# Patient Record
Sex: FEMALE
[De-identification: names, ages, dates, MRNs, and addresses within clinical notes are randomized per-mention and may not be internally consistent; named-entity substitution may affect disease eponyms.]

---

## 2019-06-03 ENCOUNTER — HOSPITAL ENCOUNTER (OUTPATIENT)
Dept: HOSPITAL 88 - ER | Age: 79
Setting detail: OBSERVATION
LOS: 2 days | Discharge: HOME | End: 2019-06-05
Attending: INTERNAL MEDICINE | Admitting: INTERNAL MEDICINE
Payer: MEDICARE

## 2019-06-03 VITALS — SYSTOLIC BLOOD PRESSURE: 144 MMHG | DIASTOLIC BLOOD PRESSURE: 66 MMHG

## 2019-06-03 VITALS — WEIGHT: 174.02 LBS | BODY MASS INDEX: 29.71 KG/M2 | HEIGHT: 64 IN

## 2019-06-03 VITALS — SYSTOLIC BLOOD PRESSURE: 142 MMHG | DIASTOLIC BLOOD PRESSURE: 68 MMHG

## 2019-06-03 VITALS — SYSTOLIC BLOOD PRESSURE: 131 MMHG | DIASTOLIC BLOOD PRESSURE: 60 MMHG

## 2019-06-03 VITALS — DIASTOLIC BLOOD PRESSURE: 67 MMHG | SYSTOLIC BLOOD PRESSURE: 178 MMHG

## 2019-06-03 VITALS — DIASTOLIC BLOOD PRESSURE: 66 MMHG | SYSTOLIC BLOOD PRESSURE: 144 MMHG

## 2019-06-03 DIAGNOSIS — K21.9: ICD-10-CM

## 2019-06-03 DIAGNOSIS — R73.03: ICD-10-CM

## 2019-06-03 DIAGNOSIS — R07.89: Primary | ICD-10-CM

## 2019-06-03 DIAGNOSIS — F39: ICD-10-CM

## 2019-06-03 DIAGNOSIS — I10: ICD-10-CM

## 2019-06-03 DIAGNOSIS — N39.0: ICD-10-CM

## 2019-06-03 DIAGNOSIS — Z82.49: ICD-10-CM

## 2019-06-03 LAB
ALBUMIN SERPL-MCNC: 3.7 G/DL (ref 3.5–5)
ALBUMIN/GLOB SERPL: 0.9 {RATIO} (ref 0.8–2)
ALP SERPL-CCNC: 66 IU/L (ref 40–150)
ALT SERPL-CCNC: 18 IU/L (ref 0–55)
ANION GAP SERPL CALC-SCNC: 10.5 MMOL/L (ref 8–16)
BACTERIA URNS QL MICRO: (no result) /HPF
BASOPHILS # BLD AUTO: 0.1 10*3/UL (ref 0–0.1)
BASOPHILS NFR BLD AUTO: 1.1 % (ref 0–1)
BILIRUB UR QL: NEGATIVE
BUN SERPL-MCNC: 16 MG/DL (ref 7–26)
BUN/CREAT SERPL: 21 (ref 6–25)
CALCIUM SERPL-MCNC: 9.5 MG/DL (ref 8.4–10.2)
CHLORIDE SERPL-SCNC: 101 MMOL/L (ref 98–107)
CK MB SERPL-MCNC: 0.7 NG/ML (ref 0–5)
CK MB SERPL-MCNC: 1.3 NG/ML (ref 0–5)
CK SERPL-CCNC: 67 IU/L (ref 29–168)
CK SERPL-CCNC: 80 IU/L (ref 29–168)
CLARITY UR: CLEAR
CO2 SERPL-SCNC: 25 MMOL/L (ref 22–29)
COLOR UR: YELLOW
DEPRECATED APTT PLAS QN: 26.5 SECONDS (ref 23.8–35.5)
DEPRECATED INR PLAS: 0.92
DEPRECATED NEUTROPHILS # BLD AUTO: 4.1 10*3/UL (ref 2.1–6.9)
DEPRECATED RBC URNS MANUAL-ACNC: (no result) /HPF (ref 0–5)
EGFRCR SERPLBLD CKD-EPI 2021: > 60 ML/MIN (ref 60–?)
EOSINOPHIL # BLD AUTO: 0.1 10*3/UL (ref 0–0.4)
EOSINOPHIL NFR BLD AUTO: 0.9 % (ref 0–6)
EPI CELLS URNS QL MICRO: (no result) /LPF
ERYTHROCYTE [DISTWIDTH] IN CORD BLOOD: 14.6 % (ref 11.7–14.4)
GLOBULIN PLAS-MCNC: 3.9 G/DL (ref 2.3–3.5)
GLUCOSE SERPLBLD-MCNC: 115 MG/DL (ref 74–118)
HCT VFR BLD AUTO: 37.1 % (ref 34.2–44.1)
HGB BLD-MCNC: 12.3 G/DL (ref 12–16)
KETONES UR QL STRIP.AUTO: NEGATIVE
LEUKOCYTE ESTERASE UR QL STRIP.AUTO: (no result)
LYMPHOCYTES # BLD: 1.9 10*3/UL (ref 1–3.2)
LYMPHOCYTES NFR BLD AUTO: 29 % (ref 18–39.1)
MCH RBC QN AUTO: 28.3 PG (ref 28–32)
MCHC RBC AUTO-ENTMCNC: 33.2 G/DL (ref 31–35)
MCV RBC AUTO: 85.3 FL (ref 81–99)
MONOCYTES # BLD AUTO: 0.5 10*3/UL (ref 0.2–0.8)
MONOCYTES NFR BLD AUTO: 7.8 % (ref 4.4–11.3)
NEUTS SEG NFR BLD AUTO: 61 % (ref 38.7–80)
NITRITE UR QL STRIP.AUTO: NEGATIVE
PLATELET # BLD AUTO: 334 X10E3/UL (ref 140–360)
POTASSIUM SERPL-SCNC: 3.5 MMOL/L (ref 3.5–5.1)
PROT UR QL STRIP.AUTO: (no result)
PROTHROMBIN TIME: 12.8 SECONDS (ref 11.9–14.5)
RBC # BLD AUTO: 4.35 X10E6/UL (ref 3.6–5.1)
SODIUM SERPL-SCNC: 133 MMOL/L (ref 136–145)
SP GR UR STRIP: 1.01 (ref 1.01–1.02)
UROBILINOGEN UR STRIP-MCNC: 0.2 MG/DL (ref 0.2–1)

## 2019-06-03 PROCEDURE — 85025 COMPLETE CBC W/AUTO DIFF WBC: CPT

## 2019-06-03 PROCEDURE — 80061 LIPID PANEL: CPT

## 2019-06-03 PROCEDURE — 84484 ASSAY OF TROPONIN QUANT: CPT

## 2019-06-03 PROCEDURE — 85610 PROTHROMBIN TIME: CPT

## 2019-06-03 PROCEDURE — 82550 ASSAY OF CK (CPK): CPT

## 2019-06-03 PROCEDURE — 71045 X-RAY EXAM CHEST 1 VIEW: CPT

## 2019-06-03 PROCEDURE — 81001 URINALYSIS AUTO W/SCOPE: CPT

## 2019-06-03 PROCEDURE — 99284 EMERGENCY DEPT VISIT MOD MDM: CPT

## 2019-06-03 PROCEDURE — 87086 URINE CULTURE/COLONY COUNT: CPT

## 2019-06-03 PROCEDURE — 85730 THROMBOPLASTIN TIME PARTIAL: CPT

## 2019-06-03 PROCEDURE — 82553 CREATINE MB FRACTION: CPT

## 2019-06-03 PROCEDURE — 83036 HEMOGLOBIN GLYCOSYLATED A1C: CPT

## 2019-06-03 PROCEDURE — 83735 ASSAY OF MAGNESIUM: CPT

## 2019-06-03 PROCEDURE — 83880 ASSAY OF NATRIURETIC PEPTIDE: CPT

## 2019-06-03 PROCEDURE — 93005 ELECTROCARDIOGRAM TRACING: CPT

## 2019-06-03 PROCEDURE — 80053 COMPREHEN METABOLIC PANEL: CPT

## 2019-06-03 PROCEDURE — 36415 COLL VENOUS BLD VENIPUNCTURE: CPT

## 2019-06-03 RX ADMIN — CEFTRIAXONE SCH MLS/HR: 100 INJECTION, POWDER, FOR SOLUTION INTRAVENOUS at 11:18

## 2019-06-03 RX ADMIN — FAMOTIDINE SCH MG: 10 INJECTION, SOLUTION INTRAVENOUS at 11:20

## 2019-06-03 RX ADMIN — FAMOTIDINE SCH MG: 10 INJECTION, SOLUTION INTRAVENOUS at 21:04

## 2019-06-03 NOTE — DIAGNOSTIC IMAGING REPORT
Examination: Single AP view of the chest.



COMPARISON: December 6, 2017



INDICATION: Chest pain

     

DISCUSSION:



Lines/tubes:  None.



Lungs:  The lungs are well inflated and clear. No pneumonia or pulmonary edema.



Pleura:  No pleural effusion or pneumothorax.



Heart and mediastinum:  The heart and the mediastinum are unremarkable.



Bones and soft tissues:  No acute bony abnormalities.  



IMPRESSION:

 

1.   No acute cardiopulmonary abnormalities.



Signed by: Dr. Andrew Palisch, M.D. on 6/3/2019 11:42 AM

## 2019-06-03 NOTE — XMS REPORT
Summary of Care: 18 - 18

                             Created on: 2093



ALDO PRICE

External Reference #: [id number]

: 1940

Sex: Female



Demographics







                          Address                   [address] [place]  [address]

 

                          Home Phone                [phone]

 

                          Preferred Language        English

 

                          Marital Status            

 

                          Latter-day Affiliation     Unknown

 

                          Race                      Other

 

                                        Additional Race(s)  

 

                          Ethnic Group              /Latin





Author







                          Author                    [facility] [place]

 

                          Organization              [facility] [place]

 

                          Address                   Unknown

 

                          Phone                     Unavailable







Encounter





HQ Encntr_alias(FIN) [id number] Date(s): 18 - 18

[facility] [place] [address] [place] [address]      [phone]
[phone]

Discharge Disposition: Home or Self Care

Attending Physician: Norma Rankin MD





Vital Signs





No data available for this section



Problem List





No data available for this section



Allergies, Adverse Reactions, Alerts





No data available for this section



Medications





No data available for this section



Results





No data available for this section



Immunizations





No data available for this section



Procedures





No data available for this section



Social History





No data available for this section



Assessment and Plan





No data available for this section

## 2019-06-03 NOTE — XMS REPORT
Summary of Care: 10/10/17 - 10/10/17

                             Created on: 2056



ALDO PRICE

External Reference #: [id number]

: 1940

Sex: Female



Demographics







                          Address                   [address] [place] [address] [place]  [address]

 

                          Home Phone                [phone]

 

                          Preferred Language        Unknown

 

                          Marital Status            

 

                          Tenriism Affiliation     Unknown

 

                          Race                      Other

 

                          Ethnic Group              Unknown





Author







                          Author                    [facility] [place]

 

                          Organization              [facility] [place]

 

                          Address                   Unknown

 

                          Phone                     Unavailable







Encounter





HQ Encntr_alias(FIN) [id number] Date(s): 10/10/17 - 10/10/17

[facility] [place] [address] [place] [address]      [phone]
[phone]

Discharge Disposition: Home or Self Care

Attending Physician: Norma Rankin MD





Vital Signs





No data available for this section



Problem List





No data available for this section



Allergies, Adverse Reactions, Alerts





No data available for this section



Medications





No data available for this section



Results





No data available for this section



Immunizations





No data available for this section



Procedures





No data available for this section



Social History





No data available for this section



Assessment and Plan





No data available for this section

## 2019-06-03 NOTE — XMS REPORT
Summary of Care: 3/11/19 - 3/11/19

                             Created on: 10/02/2071



ALBERT ALDO BONITA

External Reference #: [id number]

: 1940

Sex: Female



Demographics







                          Address                   [address] [place] [address] [place]  [address]

 

                          Home Phone                [phone]

 

                          Preferred Language        Unknown

 

                          Marital Status            

 

                          Orthodoxy Affiliation     Unknown

 

                          Race                      Other

 

                                        Additional Race(s)  

 

                          Ethnic Group              /Latin





Author







                          Author                    [facility]

 

                          Address                   Unknown

 

                          Phone                     Unavailable







Encounter





HQ Encntr_[place](FIN) [id number] Date(s): 3/11/19 - 3/11/19

[facility] [address] [address] [facility] [address], [address] [place] [address]-      

Discharge Disposition: Home or Self Care

Attending Physician: Jered Lanier MD

Referring Physician: Jered Lanier MD





Vital Signs





No data available for this section



Problem List







    



              Condition     Effective Dates     Status       Health Status     Informant

 

    



                           Diabetes(Confirmed)       Resolved  

 

    



                           Acid                      Resolved  



                                         reflux(Confirmed)    

 

    



                           High blood                Resolved  



                                         pressure(Confirmed)    







Allergies, Adverse Reactions, Alerts







   



                 Substance       Reaction        Severity        Status

 

   



                           NKDA                      Active







Medications





No data available for this section



Results





No data available for this section



Immunizations





No data available for this section



Procedures







    



              Procedure     Date         Related Diagnosis     Body Site     Status

 

    



                           Caesarean section         Completed

 

    



                           Gallbladder operation        Completed







Social History







 



                           Social History Type       Response

 

 



                           Smoking Status            Never smoker; Exposure to Tobacco Smoke None; Cigarette Smoking

 Last 365



                                         Days No; Reg Smoking Cessation Counseling No



                                         entered on: 3/4/19







Assessment and Plan





No data available for this section

## 2019-06-03 NOTE — XMS REPORT
Continuity of Care Document

                             Created on: 2019



ALDO PRICE

External Reference #: [id number]

: 1940

Sex: Female



Demographics







                          Address                   [address] [place] [address] [place]  [address]

 

                          Home Phone                [phone]

 

                          Preferred Language        Unknown

 

                          Marital Status            Unknown

 

                          Gnosticism Affiliation     Unknown

 

                          Race                      Unknown

 

                          Ethnic Group              Unknown





Author







                          Author                    [facility]              Interface

 

                          Address                   Unknown

 

                          Phone                     Unavailable



                                                    



Problems

                    





                    Problem                            Status                            Onset Date     

                          Classification                            Date Reported       

                          Comments                            Source                    

 

                    Pain in left upper arm                                                        10/25/2018

                                                        2019                   

                                                       [facility] [place]                    

 

                          Z12.39 - ENCOUNTER FOR OTH SCREENING FO                            Active         

                    2018                                                          

                                                       [facility] [place]               

     

 

                    Diabetes                            Resolved                                        

                          Problem                            2019                        

                                                       [facility] [place]                    

 

                    Acid reflux                            Resolved                                     

                          Problem                            2019                     

                                                       [facility] [place]                    

 

                    High blood pressure                            Resolved                             

                           Problem                            2019             

                                                       [facility] [place]                    

 

                    Bursitis of left shoulder                                                           

                                                      2019                      

                                                       [facility] [place]                    



                                                                                
                                                                                
      



Medications

                    





                    Medication                            Details                            Route      

                          Status                            Patient Instructions         

                          Ordering Provider                            Order Date           

                                        Source                    



                                                                        



Allergies, Adverse Reactions, Alerts

                    





                    Substance                            Category                            Reaction   

                          Severity                            Reaction type           

                          Status                            Date Reported                     

                          Comments                            Source                    

 

                          No Known Medication Allergies                            Assertion                

                                                                            Drug allergy       

                                                                                       

                                         [facility]                    



                                                                        



Immunizations

                    





                    Immunization                            Date Given                            Site  

                          Status                            Last Updated             

                          Comments                            Source                    



                                                                        



Results

                    





                    Order Name                            Results                            Value      

                          Reference Range                            Date                

                          Interpretation                            Comments                       

                                        Source                    

 

                    Renal Stone CT                            Renal Stone CT                            

EXAM:  CT ABDOMEN AND PELVIS WITHOUT CONTRAST



DATE: 3/11/2019 10:06 CDT

 

INDICATION: R31.9 Hematuria - hematuria

 

ADDITIONAL INFORMATION: Patient states found blood in urine one month ago



COMPARISON: 2012



TECHNIQUE: Volumetric CT acquisition of the abdomen and pelvis without 
intravenous contrast. Axial, coronal and sagittal reconstructions.

   IV contrast: None

   Oral contrast: None.

   DLP: 863mGy/cm



FINDINGS:  



Lines and tubes: None.



Lower thorax: Bilateral calcified hilar nodes, as well as some bronchial wall 
calcifications identified. No pleural or pericardial effusion. Bibasilar gravity
dependent subsegmental posterior basilar atelectasis.



Liver: Normal.



Biliary tree: No intra- or extrahepatic biliary ductal dilation.



Gallbladder: Cholecystectomy.



Pancreas: Focal fatty changes are noted in the pancreatic head..



Spleen: Normal.



Adrenals: Normal.



Kidneys and ureters: No evidence of abnormal calcifications, no hydronephrosis.



Bladder: Normal.



Reproductive organs: Uterus and adnexa appear unremarkable.



Gastrointestinal tract:The stomach is normal in appearance. Small sliding hiatal
hernia is noted. No abnormalities of the large and small bowel are demonstrated.
The small intestine and colon are of normal course and caliber with no 
constricting or obstructing lesions, masses, or surrounding inflammatory 
changes. Multiple colonic diverticulosis, with no evidence of acute 
diverticulitis. No rectal or perirectal abnormalities are seen.



Appendix: Normal



Peritoneum and retroperitoneum: No lymphadenopathy, ascites or free air. No 
other fluid collection.





Lymph nodes: No abdominal or pelvic lymphadenopathy is seen.



Vasculature: Mild atheromatous calcifications abdominal aorta and iliac 
arteries.

Bones: Degenerative changes of the lumbosacral spine. Interim appearance of 
focal compression deformity of L1 vertebral body.



Soft tissues/abdominal wall: Normal. No hernias, masses, or fluid collections 
are seen.



IMPRESSION:  

                                        1. No evidence of abnormal calcifications in the kidneys ureters or bladder. No 

evidence of hydronephrosis.

                                        2. Diverticulosis left colon, no evidence of acute diverticulitis.





                                                          2019                 

                                                      -

                                        -





Read by:  Mike Carlin MD

Dictated Date/time:  19 11:05

Electronically Signed by:  Mike Carlin               19 
12:04

FINAL REPORT

                                        [facility] wo contrast CT                            Elbow wo contrast CT              

                                        EXAM: CT LEFT SHOULDER WITHOUT CONTRAST

EXAM: CT LEFT HUMERUS WITHOUT CONTRAST

EXAM: CT LEFT ELBOW WITHOUT CONTRAST



DATE: 10/19/2018 1:57 PM CDT



INDICATION: M79.622   Pain in left upper arm - Left Humerus arm pain for the 
past 2 weeks



ADDITIONAL INFORMATION: Patient History Form -- "left arm pain, unable to rotate
arm"



COMPARISON: None.



TECHNIQUE: Volumetric CT scans of the left shoulder, left humerus, and left 
elbow are acquired without contrast. Axial, sagittal and coronal images are 
provided. 3D reconstructions are created at the acquisition workstation.



IV contrast: None.

DLP: 316 mGy-cm -- left shoulder and humerus

DLP: 167 mGy-cm -- left elbow



FINDINGS: 



Shoulder: No displaced fracture or malalignment. There is severe glenohumeral 
joint space narrowing with associated subcortical cystic change and sclerosis of
the inferior aspect of the glenoid and marginal osteophyte formation of the 
humeral head. There is mild degenerative bony hypertrophy and undersurface 
spurring of the acromioclavicular joint. There is subcortical cystic change at 
the anterior aspect of the greater tuberosity and lesser tuberosity.



Humerus: Intact.



Elbow: Intact.



Soft tissues: No soft tissue swelling. There is a large subacromial/subdeltoid 
bursal effusion. No elbow joint effusion. No radiopaque foreign body or 
subcutaneous emphysema. No pneumarthrosis.



IMPRESSION:  

                                        1.  No acute bony abnormality.

                                        2.  Left subacromial/subdeltoid bursitis, large volume. This is nonspecific, but

 could be related to mechanical impingement or be related to inflammatory or 
crystalline arthropathy. Consider percutaneous sampling of the fluid if 
clinically warranted.

                                        3.  Advanced glenohumeral degenerative change.



                                                          10/19/2018                 

                                                      -

                                        -

This report was dictated by a Radiology Resident/Fellow.  I have personally 
reviewed the images as

well as the Resident's interpretation and agree with the findings.

Read by:  Mert Velásquez (Fellow)       Resident:  Mert Velásquez 
(Fellow)

Dictated Date/time:  10/19/18 15:07

Electronically Signed by:  Faizan Godfrey MD                  10/19/18 
15:54

FINAL REPORT

                                        [facility] Humerus wo contrast CT                            Humerus wo contrast CT          

                                        EXAM: CT LEFT SHOULDER WITHOUT CONTRAST

EXAM: CT LEFT HUMERUS WITHOUT CONTRAST

EXAM: CT LEFT ELBOW WITHOUT CONTRAST



DATE: 10/19/2018 1:57 PM CDT



INDICATION: M79.622   Pain in left upper arm - Left Humerus arm pain for the 
past 2 weeks



ADDITIONAL INFORMATION: Patient History Form -- "left arm pain, unable to rotate
arm"



COMPARISON: None.



TECHNIQUE: Volumetric CT scans of the left shoulder, left humerus, and left 
elbow are acquired without contrast. Axial, sagittal and coronal images are 
provided. 3D reconstructions are created at the acquisition workstation.



IV contrast: None.

DLP: 316 mGy-cm -- left shoulder and humerus

DLP: 167 mGy-cm -- left elbow



FINDINGS: 



Shoulder: No displaced fracture or malalignment. There is severe glenohumeral 
joint space narrowing with associated subcortical cystic change and sclerosis of
the inferior aspect of the glenoid and marginal osteophyte formation of the 
humeral head. There is mild degenerative bony hypertrophy and undersurface 
spurring of the acromioclavicular joint. There is subcortical cystic change at 
the anterior aspect of the greater tuberosity and lesser tuberosity.



Humerus: Intact.



Elbow: Intact.



Soft tissues: No soft tissue swelling. There is a large subacromial/subdeltoid 
bursal effusion. No elbow joint effusion. No radiopaque foreign body or 
subcutaneous emphysema. No pneumarthrosis.



IMPRESSION:  

                                        1.  No acute bony abnormality.

                                        2.  Left subacromial/subdeltoid bursitis, large volume. This is nonspecific, but

 could be related to mechanical impingement or be related to inflammatory or 
crystalline arthropathy. Consider percutaneous sampling of the fluid if 
clinically warranted.

                                        3.  Advanced glenohumeral degenerative change.



                                                          10/19/2018                 

                                                      -

                                        -

This report was dictated by a Radiology Resident/Fellow.  I have personally 
reviewed the images as

well as the Resident's interpretation and agree with the findings.

Read by:  Mert Velásquez (Fellow)       Resident:  Mert Velásquez 
(Fellow)

Dictated Date/time:  10/19/18 15:07

Electronically Signed by:  Faizan Godfrey MD                  10/19/18 
15:54

FINAL REPORT

                                        [facility] wo contrast CT                            Shoulder wo contrast CT        

                                        EXAM: CT LEFT SHOULDER WITHOUT CONTRAST

EXAM: CT LEFT HUMERUS WITHOUT CONTRAST

EXAM: CT LEFT ELBOW WITHOUT CONTRAST



DATE: 10/19/2018 1:57 PM CDT



INDICATION: M79.622   Pain in left upper arm - Left Humerus arm pain for the 
past 2 weeks



ADDITIONAL INFORMATION: Patient History Form -- "left arm pain, unable to rotate
arm"



COMPARISON: None.



TECHNIQUE: Volumetric CT scans of the left shoulder, left humerus, and left 
elbow are acquired without contrast. Axial, sagittal and coronal images are 
provided. 3D reconstructions are created at the acquisition workstation.



IV contrast: None.

DLP: 316 mGy-cm -- left shoulder and humerus

DLP: 167 mGy-cm -- left elbow



FINDINGS: 



Shoulder: No displaced fracture or malalignment. There is severe glenohumeral 
joint space narrowing with associated subcortical cystic change and sclerosis of
the inferior aspect of the glenoid and marginal osteophyte formation of the 
humeral head. There is mild degenerative bony hypertrophy and undersurface 
spurring of the acromioclavicular joint. There is subcortical cystic change at 
the anterior aspect of the greater tuberosity and lesser tuberosity.



Humerus: Intact.



Elbow: Intact.



Soft tissues: No soft tissue swelling. There is a large subacromial/subdeltoid 
bursal effusion. No elbow joint effusion. No radiopaque foreign body or 
subcutaneous emphysema. No pneumarthrosis.



IMPRESSION:  

                                        1.  No acute bony abnormality.

                                        2.  Left subacromial/subdeltoid bursitis, large volume. This is nonspecific, but

 could be related to mechanical impingement or be related to inflammatory or 
crystalline arthropathy. Consider percutaneous sampling of the fluid if 
clinically warranted.

                                        3.  Advanced glenohumeral degenerative change.



                                                          10/19/2018                 

                                                      -

                                        -

This report was dictated by a Radiology Resident/Fellow.  I have personally 
reviewed the images as

well as the Resident's interpretation and agree with the findings.

Read by:  Mert Velásquez (Fellow)       Resident:  Mert Velásquez 
(Fellow)

Dictated Date/time:  10/19/18 15:07

Electronically Signed by:  Faizan Godfrey MD                  10/19/18 
15:54

FINAL REPORT

                                        [facility]                    

 

                          Breast Mammo Scrn SULMA incl CAD MA                            Breast Mammo Scrn SULMA

 incl CAD MA                         





BILATERAL DIGITAL SCREENING MAMMOGRAM WITH CAD: 2018





CLINICAL: Encounter For Screening Mammogram For Malignant Neoplasm Of 
Breast/Z12.31.  





Current study was evaluated with a Computer Aided Detection (CAD) system.  



COMPARISON:Comparison is made to exams dated:  2017 mammogram, 2016 
mammogram - [facility], 2015 mammogram, 4/3/2014 mammogram, 
and 3/27/2013 mammogram - [facility].   



TECHNIQUE: Mammographic views were obtained using digital acquisition. Current 
study was also evaluated with a Computer Aided Detection (CAD) system. 



FINDINGS: 

The tissue of both breasts is heterogeneously dense, which could obscure 
detection of small masses.  



There are benign calcifications in both breasts.  

No significant masses, calcifications, or other findings are seen in either 
breast.  

There has been no significant interval change.





IMPRESSION: BENIGN



RECOMMENDATION:There is no mammographic evidence of malignancy. A 1 year 
screening mammogram is recommended.(2019)   This exam was interpreted at 
[id number] for AUSTIN Christiansen 15.  



Professional services are provided by the [facility] 
[facility].



Mak Whiteside M.D., cm/mariela:2018 15:25:44  



Imaging Technologist(s): RT Aroldo(R)(M), [facility]

letter sent: BI-RADS 1/2 Dense  

Mammogram BI-RADS: 2 Benign

                                                          2018                 

                                                      -

                                        -





Read by:  Mir Polanco MD

Dictated Date/time:  18 15:25

Electronically Signed by:  Mir Polanco MD                      18 
15:25

FINAL REPORT

                                        [facility]                    

 

                          Bone Density DXA Dual Energy MA                            Bone Density DXA Dual Energy

 MA                         



BONE DENSITY ASSESSMENT: 2018



CLINICAL DATA: Post menopausal.  M81.0   Age-Related Osteoporosis Without 
Current Pathological Fracture/M81.0   Age-Related Osteoporosis Without Current 
Pathological Fracture



FINDINGS: 

Bone density evaluation was performed 2018 on the right femur neck using a
Hologic unit. The BMD average for the exam is 0.675  g/cm2. The T-score is -1.60
and the Z-score is 0.40.  This matches the World Health Organization's criteria 
for osteopenia and places the patient at a medium risk for fracture.  



An additional bone density evaluation was performed 2018 on the left femur
neck using a Hologic unit. The BMD average for the exam is 0.683  g/cm2. The T-
score is -1.50 and the Z-score is 0.50.  This matches the World Health 
Organization's criteria for osteopenia and places the patient at a medium risk 
for fracture.  



An additional bone density evaluation was performed 2018 on the right hip 
using a Hologic unit. The BMD average for the exam is 0.751  g/cm2. The T-score 
is -1.60 and the Z-score is 0.30.  This matches the World Health Organization's 
criteria for osteopenia and places the patient at a medium risk for fracture.  



An additional bone density evaluation was performed 2018 on the left hip 
using a Hologic unit. The BMD average for the exam is 0.785  g/cm2. The T-score 
is -1.30 and the Z-score is 0.50.  This matches the World Health Organization's 
criteria for osteopenia and places the patient at a medium risk for fracture.  



An additional bone density evaluation was performed 2018 on the AP L2-L4 
region of spine using a Hologic unit. The BMD average for the exam is 0.901  
g/cm2. The T-score is -1.60 and the Z-score is 0.90.  This matches the World 
Health Organization's criteria for osteopenia and places the patient at a medium
risk for fracture.  



FRAX 10 year probability of major osteoporotic fracture is 6.2% and hip fracture
is 1.2%.  



IMPRESSION: OSTEOPENIA

Patient is at medium risk for fracture.   



Dr. Violeta Barraza D.O.  

ht/penrad:2018 13:57:30  



Imaging Technologist(s): Karmen MARTINEZ)(VICKY), [facility]

                                                          2018                 

                                                      -

                                        -





Read by:  Violeta Barraza DO

Dictated Date/time:  18 13:57

Electronically Signed by:  Violeta Barraza DO                  18 
13:57

FINAL REPORT

                                         [facility]                    



                                                                                
                                                                                
              



Vital Signs

                    





                    Vital Sign                            Value                            Date         

                          Comments                            Source                    



                                                                        



Encounters

                    





                    Location                            Location Details                            Encounter

 Type                            Encounter Number                            Reason For

 Visit                            Attending Provider                            ADM Date

                            DC Date                            Status                

                                        Source                    

 

                          [facility] [place]                                                

                          Outpt Diag Services                            [phone]                  

                                                      Norma Rankin                          

                    10/10/2017                            10/11/2017                                    

                                         [facility] [place]                    

 

                          [facility] [place]                                                

                          Outpt Diag Services                            [phone]                  

                                                      Norma Nguyễn-Yesenia                          

                    2018                                    

                                         [facility] [place]                    

 

                          [facility] [place]                                                

                          Outpt Diag Services                            [phone]                  

                                                      Norma Rankin                          

                    2018                                    

                                         [facility] [place]                    

 

                          [facility] [place]                                                

                          Outpt Diag Services                            [phone]                  

                                                      Norma Rankin                          

                    10/19/2018                            10/20/2018                                    

                                        [facility]                    

 

                                                                            Outpatient                  

                    [id number]                                                        [facility]                             2019                                       

                          Active                            [facility] [facility]                                                

                          Outpt Diag Services                            [id number]                  

                                                Formerly [facility]                             2019                                               

                                        [facility]                    

 

                                                                            Outpatient                  

                    [id number]                                                        [facility]                             2019                                       

                          Active                            [facility]                  

  



                                                                                
                                                                           



Procedures

                    





                    Procedure                            Code                            Date           

                          Perfomer                            Comments                        

                                        Source                    

 

                    Caesarean section                            [id number]                               

                                                                                 [facility]                    

 

                          Gallbladder operation                            [id number]                         

                                                                                                    [facility]

## 2019-06-03 NOTE — XMS REPORT
Summary of Care: 18 - 18

                             Created on: 2078



ALDO PRICE

External Reference #: [id number]

: 1940

Sex: Female



Demographics







                          Address                   [address] [place] [address] [place]  [address]

 

                          Home Phone                [phone]

 

                          Preferred Language        Unknown

 

                          Marital Status            

 

                          Faith Affiliation     Unknown

 

                          Race                      Other

 

                                        Additional Race(s)  

 

                          Ethnic Group              /Latin





Author







                          Author                    [facility]

 

                          Organization              [facility]

 

                          Address                   Unknown

 

                          Phone                     Unavailable







Encounter





HQ Encntr_alias(FIN) [id number] Date(s): 18 - 18

[facility] [address] [place] [address]      [phone]
[phone]

Discharge Disposition: Home or Self Care

Attending Physician: Norma Rankin MD





Vital Signs





No data available for this section



Problem List





No data available for this section



Allergies, Adverse Reactions, Alerts





No data available for this section



Medications





No data available for this section



Results





No data available for this section



Immunizations





No data available for this section



Procedures





No data available for this section



Social History





No data available for this section



Assessment and Plan





No data available for this section

## 2019-06-03 NOTE — XMS REPORT
Patient Summary Document

                             Created on: 2019



ALDO PRICE

External Reference #: [id number]

: 1940

Sex: Female



Demographics







                          Address                   [address] [place]  [address]

 

                          Home Phone                [phone]

 

                          Preferred Language        Unknown

 

                          Marital Status            Unknown

 

                          Religion Affiliation     Unknown

 

                          Race                      Unknown

 

                                        Additional Race(s)  

 

                          Ethnic Group              Unknown





Author







                          Author                    [facility]

 

                          Address                   Unknown

 

                          Phone                     Unavailable







Care Team Providers







                    Care Team Member Name    Role                Phone

 

                    PRITESH BUI    Unavailable         Unavailable







Problems

This patient has no known problems.



Allergies, Adverse Reactions, Alerts

This patient has no known allergies or adverse reactions.



Medications

This patient has no known medications.



Results







           Test Description    Test Time    Test Comments    Text Results    Atomic Results    Result

 Comments

 

                CHEST SINGLE (PORTABLE)                                        [place] [address]      Patient Name: 
ALDO PRICE   MR #: [id number]    : 1940 Age/Sex: 77/F  Acct #: 
[id number] Req #: [id number]  Adm Physician:     Ordered by: MUNIR BUI MD  Report #: [id number]   Location: ER  Room/Bed:     
_______________________________________________________________________
____________________________    Procedure: [id number] DX/CHEST SINGLE (PORTABLE) 
Exam Date:                             Exam Time:        REPORT STATUS: Signed  
 Single view chest  2017      Clinical history: Flulike symptoms.   
Technique: AP view chest   Comparison: None available.      Findings:   The 
lungs are clear and symmetrically inflated. No pleural effusions.  Cardiac   
size is normal for technique.  Pulmonary vasculature is normal.  The skeleton   
is grossly intact.          Impression:   No acute abnormality.            This 
report was generated with voice-recognition technology. Errors in   
transcription can occur. Please interpret accordingly and contact a radiologist 
 if there are any questions regarding the report.      Signed by: Dr. Raji Chin M.D. on 2017 9:21 PM        Dictated By: RAJI CHIN MD  
Electronically Signed By: RAJI CHIN MD on 17  Transcribed By: 
JERRY on 17       COPY TO:   MUNIR BUI MD

## 2019-06-04 VITALS — SYSTOLIC BLOOD PRESSURE: 126 MMHG | DIASTOLIC BLOOD PRESSURE: 58 MMHG

## 2019-06-04 VITALS — SYSTOLIC BLOOD PRESSURE: 160 MMHG | DIASTOLIC BLOOD PRESSURE: 66 MMHG

## 2019-06-04 VITALS — SYSTOLIC BLOOD PRESSURE: 163 MMHG | DIASTOLIC BLOOD PRESSURE: 70 MMHG

## 2019-06-04 VITALS — DIASTOLIC BLOOD PRESSURE: 58 MMHG | SYSTOLIC BLOOD PRESSURE: 126 MMHG

## 2019-06-04 VITALS — SYSTOLIC BLOOD PRESSURE: 140 MMHG | DIASTOLIC BLOOD PRESSURE: 65 MMHG

## 2019-06-04 VITALS — SYSTOLIC BLOOD PRESSURE: 121 MMHG | DIASTOLIC BLOOD PRESSURE: 59 MMHG

## 2019-06-04 LAB
CHOLEST SERPL-MCNC: 197 MD/DL (ref 0–199)
CHOLEST/HDLC SERPL: 3.2 {RATIO} (ref 3–3.6)
CK MB SERPL-MCNC: 1.2 NG/ML (ref 0–5)
CK SERPL-CCNC: 75 IU/L (ref 29–168)
HDLC SERPL-MSCNC: 61 MG/DL (ref 40–60)
LDLC SERPL CALC-MCNC: 115 MG/DL (ref 60–130)
TRIGL SERPL-MCNC: 103 MG/DL (ref 0–149)

## 2019-06-04 RX ADMIN — VENLAFAXINE HYDROCHLORIDE SCH MG: 37.5 CAPSULE, EXTENDED RELEASE ORAL at 09:31

## 2019-06-04 RX ADMIN — FAMOTIDINE SCH MG: 20 TABLET, FILM COATED ORAL at 17:49

## 2019-06-04 RX ADMIN — ASPIRIN SCH MG: 81 TABLET, COATED ORAL at 09:29

## 2019-06-04 RX ADMIN — CEFTRIAXONE SCH MLS/HR: 100 INJECTION, POWDER, FOR SOLUTION INTRAVENOUS at 11:56

## 2019-06-04 RX ADMIN — LOSARTAN POTASSIUM SCH MG: 100 TABLET, FILM COATED ORAL at 09:29

## 2019-06-04 RX ADMIN — ENOXAPARIN SODIUM SCH MG: 40 INJECTION SUBCUTANEOUS at 17:49

## 2019-06-04 RX ADMIN — FAMOTIDINE SCH MG: 20 TABLET, FILM COATED ORAL at 09:30

## 2019-06-04 RX ADMIN — AMLODIPINE BESYLATE SCH MG: 5 TABLET ORAL at 09:30

## 2019-06-05 VITALS — SYSTOLIC BLOOD PRESSURE: 112 MMHG | DIASTOLIC BLOOD PRESSURE: 56 MMHG

## 2019-06-05 VITALS — SYSTOLIC BLOOD PRESSURE: 154 MMHG | DIASTOLIC BLOOD PRESSURE: 68 MMHG

## 2019-06-05 VITALS — SYSTOLIC BLOOD PRESSURE: 130 MMHG | DIASTOLIC BLOOD PRESSURE: 62 MMHG

## 2019-06-05 VITALS — DIASTOLIC BLOOD PRESSURE: 58 MMHG | SYSTOLIC BLOOD PRESSURE: 126 MMHG

## 2019-06-05 VITALS — DIASTOLIC BLOOD PRESSURE: 68 MMHG | SYSTOLIC BLOOD PRESSURE: 154 MMHG

## 2019-06-05 VITALS — DIASTOLIC BLOOD PRESSURE: 85 MMHG | SYSTOLIC BLOOD PRESSURE: 139 MMHG

## 2019-06-05 RX ADMIN — ASPIRIN SCH MG: 81 TABLET, COATED ORAL at 08:27

## 2019-06-05 RX ADMIN — FAMOTIDINE SCH MG: 20 TABLET, FILM COATED ORAL at 16:30

## 2019-06-05 RX ADMIN — ENOXAPARIN SODIUM SCH MG: 40 INJECTION SUBCUTANEOUS at 17:00

## 2019-06-05 RX ADMIN — VENLAFAXINE HYDROCHLORIDE SCH MG: 37.5 CAPSULE, EXTENDED RELEASE ORAL at 08:27

## 2019-06-05 RX ADMIN — CEFTRIAXONE SCH MLS/HR: 100 INJECTION, POWDER, FOR SOLUTION INTRAVENOUS at 11:46

## 2019-06-05 RX ADMIN — LOSARTAN POTASSIUM SCH MG: 100 TABLET, FILM COATED ORAL at 08:27

## 2019-06-05 RX ADMIN — FAMOTIDINE SCH MG: 20 TABLET, FILM COATED ORAL at 07:30

## 2019-06-05 RX ADMIN — AMLODIPINE BESYLATE SCH MG: 5 TABLET ORAL at 08:27

## 2022-09-06 ENCOUNTER — HOSPITAL ENCOUNTER (EMERGENCY)
Dept: HOSPITAL 88 - ER | Age: 82
LOS: 1 days | Discharge: HOME | End: 2022-09-07
Payer: MEDICARE

## 2022-09-06 VITALS — HEIGHT: 64 IN | BODY MASS INDEX: 29.02 KG/M2 | WEIGHT: 170 LBS

## 2022-09-06 DIAGNOSIS — F41.9: Primary | ICD-10-CM

## 2022-09-06 DIAGNOSIS — I10: ICD-10-CM

## 2022-09-06 DIAGNOSIS — N39.0: ICD-10-CM

## 2022-09-06 DIAGNOSIS — E11.65: ICD-10-CM

## 2022-09-06 LAB
BASOPHILS # BLD AUTO: 0.1 10*3/UL (ref 0–0.1)
BASOPHILS NFR BLD AUTO: 0.8 % (ref 0–1)
DEPRECATED NEUTROPHILS # BLD AUTO: 5.9 10*3/UL (ref 2.1–6.9)
EOSINOPHIL # BLD AUTO: 0.1 10*3/UL (ref 0–0.4)
EOSINOPHIL NFR BLD AUTO: 1.4 % (ref 0–6)
ERYTHROCYTE [DISTWIDTH] IN CORD BLOOD: 14.1 % (ref 11.7–14.4)
HCT VFR BLD AUTO: 38.7 % (ref 34.2–44.1)
HGB BLD-MCNC: 13 G/DL (ref 12–16)
LYMPHOCYTES # BLD: 3 10*3/UL (ref 1–3.2)
LYMPHOCYTES NFR BLD AUTO: 29.6 % (ref 18–39.1)
MCH RBC QN AUTO: 29 PG (ref 28–32)
MCHC RBC AUTO-ENTMCNC: 33.6 G/DL (ref 31–35)
MCV RBC AUTO: 86.2 FL (ref 81–99)
MONOCYTES # BLD AUTO: 0.9 10*3/UL (ref 0.2–0.8)
MONOCYTES NFR BLD AUTO: 9 % (ref 4.4–11.3)
NEUTS SEG NFR BLD AUTO: 59 % (ref 38.7–80)
PLATELET # BLD AUTO: 318 X10E3/UL (ref 140–360)
RBC # BLD AUTO: 4.49 X10E6/UL (ref 3.6–5.1)

## 2022-09-06 PROCEDURE — 99284 EMERGENCY DEPT VISIT MOD MDM: CPT

## 2022-09-06 PROCEDURE — 85025 COMPLETE CBC W/AUTO DIFF WBC: CPT

## 2022-09-06 PROCEDURE — 80053 COMPREHEN METABOLIC PANEL: CPT

## 2022-09-06 PROCEDURE — 93005 ELECTROCARDIOGRAM TRACING: CPT

## 2022-09-06 PROCEDURE — 81001 URINALYSIS AUTO W/SCOPE: CPT

## 2022-09-06 PROCEDURE — 71045 X-RAY EXAM CHEST 1 VIEW: CPT

## 2022-09-06 PROCEDURE — 83880 ASSAY OF NATRIURETIC PEPTIDE: CPT

## 2022-09-06 PROCEDURE — 36415 COLL VENOUS BLD VENIPUNCTURE: CPT

## 2022-09-07 LAB
ALBUMIN SERPL-MCNC: 4.1 G/DL (ref 3.5–5)
ALBUMIN/GLOB SERPL: 1 {RATIO} (ref 0.8–2)
ALP SERPL-CCNC: 82 IU/L (ref 40–150)
ALT SERPL-CCNC: 19 IU/L (ref 0–55)
ANION GAP SERPL CALC-SCNC: 16.7 MMOL/L (ref 8–16)
BACTERIA URNS QL MICRO: (no result) /HPF
BUN SERPL-MCNC: 24 MG/DL (ref 7–26)
BUN/CREAT SERPL: 30 (ref 6–25)
CALCIUM SERPL-MCNC: 9.6 MG/DL (ref 8.4–10.2)
CHLORIDE SERPL-SCNC: 102 MMOL/L (ref 98–107)
CLARITY UR: CLEAR
CO2 SERPL-SCNC: 23 MMOL/L (ref 22–29)
COLOR UR: YELLOW
DEPRECATED RBC URNS MANUAL-ACNC: (no result) /HPF (ref 0–5)
EPI CELLS URNS QL MICRO: (no result) /LPF
GLOBULIN PLAS-MCNC: 4.1 G/DL (ref 2.3–3.5)
GLUCOSE SERPLBLD-MCNC: 137 MG/DL (ref 74–118)
KETONES UR QL STRIP.AUTO: NEGATIVE
LEUKOCYTE ESTERASE UR QL STRIP.AUTO: (no result)
NITRITE UR QL STRIP.AUTO: NEGATIVE
POTASSIUM SERPL-SCNC: 3.7 MMOL/L (ref 3.5–5.1)
PROT UR QL STRIP.AUTO: NEGATIVE
SODIUM SERPL-SCNC: 138 MMOL/L (ref 136–145)
SP GR UR STRIP: 1.01 (ref 1.01–1.02)
UROBILINOGEN UR STRIP-MCNC: 0.2 MG/DL (ref 0.2–1)
WBC #/AREA URNS HPF: (no result) /HPF (ref 0–5)